# Patient Record
Sex: MALE | Race: WHITE | NOT HISPANIC OR LATINO | Employment: FULL TIME | URBAN - NONMETROPOLITAN AREA
[De-identification: names, ages, dates, MRNs, and addresses within clinical notes are randomized per-mention and may not be internally consistent; named-entity substitution may affect disease eponyms.]

---

## 2019-01-07 ENCOUNTER — HOSPITAL ENCOUNTER (EMERGENCY)
Facility: HOSPITAL | Age: 49
Discharge: HOME | End: 2019-01-07
Attending: FAMILY MEDICINE | Admitting: FAMILY MEDICINE

## 2019-01-07 ENCOUNTER — APPOINTMENT (OUTPATIENT)
Dept: CT IMAGING | Facility: HOSPITAL | Age: 49
End: 2019-01-07

## 2019-01-07 VITALS
WEIGHT: 265 LBS | HEART RATE: 60 BPM | HEIGHT: 75 IN | DIASTOLIC BLOOD PRESSURE: 71 MMHG | BODY MASS INDEX: 32.95 KG/M2 | SYSTOLIC BLOOD PRESSURE: 125 MMHG | TEMPERATURE: 97 F | OXYGEN SATURATION: 96 % | RESPIRATION RATE: 18 BRPM

## 2019-01-07 DIAGNOSIS — R10.9 ABDOMINAL PAIN, UNSPECIFIED ABDOMINAL LOCATION: Primary | ICD-10-CM

## 2019-01-07 LAB
ALBUMIN SERPL-MCNC: 4.5 G/DL (ref 3.4–4.8)
ALBUMIN/GLOB SERPL: 2.1 G/DL (ref 1.1–1.8)
ALP SERPL-CCNC: 56 U/L (ref 38–126)
ALT SERPL W P-5'-P-CCNC: 26 U/L (ref 21–72)
AMYLASE SERPL-CCNC: 67 U/L (ref 50–130)
ANION GAP SERPL CALCULATED.3IONS-SCNC: 10 MMOL/L (ref 5–15)
AST SERPL-CCNC: 35 U/L (ref 17–59)
BASOPHILS # BLD AUTO: 0.02 10*3/MM3 (ref 0–0.2)
BASOPHILS NFR BLD AUTO: 0.2 % (ref 0–2)
BILIRUB SERPL-MCNC: 0.7 MG/DL (ref 0.2–1.3)
BILIRUB UR QL STRIP: NEGATIVE
BUN BLD-MCNC: 10 MG/DL (ref 7–21)
BUN/CREAT SERPL: 10.6 (ref 7–25)
CALCIUM SPEC-SCNC: 9.3 MG/DL (ref 8.4–10.2)
CHLORIDE SERPL-SCNC: 97 MMOL/L (ref 95–110)
CK SERPL-CCNC: 96 U/L (ref 55–170)
CLARITY UR: CLEAR
CO2 SERPL-SCNC: 27 MMOL/L (ref 22–31)
COLOR UR: YELLOW
CREAT BLD-MCNC: 0.94 MG/DL (ref 0.7–1.3)
DEPRECATED RDW RBC AUTO: 42.3 FL (ref 35.1–43.9)
EOSINOPHIL # BLD AUTO: 0.12 10*3/MM3 (ref 0–0.7)
EOSINOPHIL NFR BLD AUTO: 1.2 % (ref 0–7)
ERYTHROCYTE [DISTWIDTH] IN BLOOD BY AUTOMATED COUNT: 12.7 % (ref 11.5–14.5)
GFR SERPL CREATININE-BSD FRML MDRD: 85 ML/MIN/1.73 (ref 56–130)
GLOBULIN UR ELPH-MCNC: 2.1 GM/DL (ref 2.3–3.5)
GLUCOSE BLD-MCNC: 92 MG/DL (ref 60–100)
GLUCOSE UR STRIP-MCNC: NEGATIVE MG/DL
HCT VFR BLD AUTO: 46.9 % (ref 39–49)
HGB BLD-MCNC: 16.1 G/DL (ref 13.7–17.3)
HGB UR QL STRIP.AUTO: NEGATIVE
HOLD SPECIMEN: NORMAL
IMM GRANULOCYTES # BLD AUTO: 0.02 10*3/MM3 (ref 0–0.02)
IMM GRANULOCYTES NFR BLD AUTO: 0.2 % (ref 0–0.5)
KETONES UR QL STRIP: ABNORMAL
LEUKOCYTE ESTERASE UR QL STRIP.AUTO: NEGATIVE
LIPASE SERPL-CCNC: 72 U/L (ref 23–300)
LYMPHOCYTES # BLD AUTO: 2.46 10*3/MM3 (ref 0.6–4.2)
LYMPHOCYTES NFR BLD AUTO: 24.1 % (ref 10–50)
MCH RBC QN AUTO: 31.3 PG (ref 26.5–34)
MCHC RBC AUTO-ENTMCNC: 34.3 G/DL (ref 31.5–36.3)
MCV RBC AUTO: 91.1 FL (ref 80–98)
MONOCYTES # BLD AUTO: 0.44 10*3/MM3 (ref 0–0.9)
MONOCYTES NFR BLD AUTO: 4.3 % (ref 0–12)
NEUTROPHILS # BLD AUTO: 7.15 10*3/MM3 (ref 2–8.6)
NEUTROPHILS NFR BLD AUTO: 70 % (ref 37–80)
NITRITE UR QL STRIP: NEGATIVE
PH UR STRIP.AUTO: 5.5 [PH] (ref 5–9)
PLATELET # BLD AUTO: 199 10*3/MM3 (ref 150–450)
PMV BLD AUTO: 11.3 FL (ref 8–12)
POTASSIUM BLD-SCNC: 3.8 MMOL/L (ref 3.5–5.1)
PROT SERPL-MCNC: 6.6 G/DL (ref 6.3–8.6)
PROT UR QL STRIP: NEGATIVE
RBC # BLD AUTO: 5.15 10*6/MM3 (ref 4.37–5.74)
SODIUM BLD-SCNC: 134 MMOL/L (ref 137–145)
SP GR UR STRIP: 1.03 (ref 1–1.03)
UROBILINOGEN UR QL STRIP: ABNORMAL
WBC NRBC COR # BLD: 10.21 10*3/MM3 (ref 3.2–9.8)
WHOLE BLOOD HOLD SPECIMEN: NORMAL

## 2019-01-07 PROCEDURE — 25010000002 ONDANSETRON PER 1 MG: Performed by: FAMILY MEDICINE

## 2019-01-07 PROCEDURE — 85025 COMPLETE CBC W/AUTO DIFF WBC: CPT | Performed by: FAMILY MEDICINE

## 2019-01-07 PROCEDURE — 99284 EMERGENCY DEPT VISIT MOD MDM: CPT

## 2019-01-07 PROCEDURE — 0 DIATRIZOATE MEGLUMINE & SODIUM PER 1 ML: Performed by: FAMILY MEDICINE

## 2019-01-07 PROCEDURE — 82550 ASSAY OF CK (CPK): CPT | Performed by: FAMILY MEDICINE

## 2019-01-07 PROCEDURE — 96361 HYDRATE IV INFUSION ADD-ON: CPT

## 2019-01-07 PROCEDURE — 96374 THER/PROPH/DIAG INJ IV PUSH: CPT

## 2019-01-07 PROCEDURE — 96375 TX/PRO/DX INJ NEW DRUG ADDON: CPT

## 2019-01-07 PROCEDURE — 82150 ASSAY OF AMYLASE: CPT | Performed by: FAMILY MEDICINE

## 2019-01-07 PROCEDURE — 25010000002 MORPHINE PER 10 MG: Performed by: FAMILY MEDICINE

## 2019-01-07 PROCEDURE — 74177 CT ABD & PELVIS W/CONTRAST: CPT

## 2019-01-07 PROCEDURE — 83690 ASSAY OF LIPASE: CPT | Performed by: FAMILY MEDICINE

## 2019-01-07 PROCEDURE — 80053 COMPREHEN METABOLIC PANEL: CPT | Performed by: FAMILY MEDICINE

## 2019-01-07 PROCEDURE — 81003 URINALYSIS AUTO W/O SCOPE: CPT | Performed by: FAMILY MEDICINE

## 2019-01-07 PROCEDURE — 25010000002 IOPAMIDOL 61 % SOLUTION: Performed by: FAMILY MEDICINE

## 2019-01-07 RX ORDER — HYDROCODONE BITARTRATE AND ACETAMINOPHEN 10; 325 MG/1; MG/1
1 TABLET ORAL EVERY 6 HOURS PRN
COMMUNITY

## 2019-01-07 RX ORDER — LORAZEPAM 1 MG/1
1 TABLET ORAL EVERY 8 HOURS PRN
COMMUNITY

## 2019-01-07 RX ORDER — ONDANSETRON 2 MG/ML
4 INJECTION INTRAMUSCULAR; INTRAVENOUS ONCE
Status: COMPLETED | OUTPATIENT
Start: 2019-01-07 | End: 2019-01-07

## 2019-01-07 RX ADMIN — IOPAMIDOL 94 ML: 612 INJECTION, SOLUTION INTRAVENOUS at 19:40

## 2019-01-07 RX ADMIN — ONDANSETRON 4 MG: 2 INJECTION INTRAMUSCULAR; INTRAVENOUS at 17:53

## 2019-01-07 RX ADMIN — MORPHINE SULFATE 4 MG: 4 INJECTION INTRAVENOUS at 17:53

## 2019-01-07 RX ADMIN — SODIUM CHLORIDE 1000 ML: 9 INJECTION, SOLUTION INTRAVENOUS at 17:51

## 2019-01-07 RX ADMIN — DIATRIZOATE MEGLUMINE AND DIATRIZOATE SODIUM 30 ML: 660; 100 LIQUID ORAL; RECTAL at 19:41

## 2019-01-07 NOTE — ED NOTES
Lobby announcement made at this time to explain wait times.     Cathie Krishnan RN  01/07/19 3290

## 2019-01-08 NOTE — ED PROVIDER NOTES
Subjective   Patient developed sharp abdominal pain at roughly 7 AM this this morning.  Nausea began in the past hour.  Patient does have a history of gastric bypass surgery and back surgery.        Abdominal Pain   Pain location:  Periumbilical and LLQ  Pain quality: cramping and sharp    Pain radiates to:  Does not radiate  Pain severity:  Moderate  Onset quality:  Sudden  Duration:  10 hours  Timing:  Intermittent  Progression:  Waxing and waning  Chronicity:  New  Context: not alcohol use, not suspicious food intake and not trauma    Relieved by:  Nothing  Worsened by:  Nothing  Ineffective treatments:  None tried  Associated symptoms: nausea    Associated symptoms: no chest pain, no chills, no cough, no diarrhea, no dysuria, no fatigue, no fever, no shortness of breath, no sore throat and no vomiting    Risk factors: multiple surgeries    Nausea   The primary symptoms include abdominal pain and nausea. Primary symptoms do not include fever, fatigue, vomiting, diarrhea, dysuria, myalgias or rash.   The illness does not include chills.       Review of Systems   Constitutional: Negative for appetite change, chills, diaphoresis, fatigue and fever.   HENT: Negative for congestion, ear discharge, ear pain, nosebleeds, rhinorrhea, sinus pressure, sore throat and trouble swallowing.    Eyes: Negative for discharge and redness.   Respiratory: Negative for apnea, cough, chest tightness, shortness of breath and wheezing.    Cardiovascular: Negative for chest pain.   Gastrointestinal: Positive for abdominal pain and nausea. Negative for diarrhea and vomiting.   Endocrine: Negative for polyuria.   Genitourinary: Negative for dysuria, frequency and urgency.   Musculoskeletal: Negative for myalgias and neck pain.   Skin: Negative for color change and rash.   Allergic/Immunologic: Negative for immunocompromised state.   Neurological: Negative for dizziness, seizures, syncope, weakness, light-headedness and headaches.    Hematological: Negative for adenopathy. Does not bruise/bleed easily.   Psychiatric/Behavioral: Negative for behavioral problems and confusion.   All other systems reviewed and are negative.      Past Medical History:   Diagnosis Date   • Arthritis    • Injury of back    • Scoliosis        No Known Allergies    Past Surgical History:   Procedure Laterality Date   • BACK SURGERY     • JENNY-EN-Y PROCEDURE         History reviewed. No pertinent family history.    Social History     Socioeconomic History   • Marital status:      Spouse name: Not on file   • Number of children: Not on file   • Years of education: Not on file   • Highest education level: Not on file   Tobacco Use   • Smoking status: Current Some Day Smoker   Substance and Sexual Activity   • Alcohol use: No     Frequency: Never   • Drug use: No   • Sexual activity: Defer           Objective   Physical Exam   Constitutional: He is oriented to person, place, and time. He appears well-developed and well-nourished.   HENT:   Head: Normocephalic and atraumatic.   Nose: Nose normal.   Mouth/Throat: Oropharynx is clear and moist.   Eyes: Conjunctivae and EOM are normal. Pupils are equal, round, and reactive to light. Right eye exhibits no discharge. Left eye exhibits no discharge. No scleral icterus.   Neck: Normal range of motion. Neck supple. No tracheal deviation present.   Cardiovascular: Normal rate, regular rhythm and normal heart sounds.   No murmur heard.  Pulmonary/Chest: Effort normal and breath sounds normal. No stridor. No respiratory distress. He has no wheezes. He has no rales.   Abdominal: Soft. Bowel sounds are normal. He exhibits no distension and no mass. There is tenderness in the periumbilical area and left lower quadrant. There is no rebound and no guarding.   Musculoskeletal: He exhibits no edema.   Neurological: He is alert and oriented to person, place, and time. Coordination normal.   Skin: Skin is warm and dry. No rash noted.  No erythema.   Psychiatric: He has a normal mood and affect. His behavior is normal. Thought content normal.   Nursing note and vitals reviewed.      Procedures           ED Course  ED Course as of Jan 07 2014 Mon Jan 07, 2019   1909 Patient signed out to me at shift change at 1900 by Dr. Miller.  Patient is alert and resting comfortably.  Awaiting CT scan.  [DR]   2012 Patient examined abdomen is soft and nondistended and nontender to palpation in all 4 quadrants.  I reviewed the results of the patient's evaluation with him and his wife.  I recommended primary care follow-up tomorrow.  I advised him to return to the emergency department if he develops a fever or if his symptoms change or worsen or any concerns.  [DR]      ED Course User Index  [DR] Deo Albrecht MD      Labs Reviewed   COMPREHENSIVE METABOLIC PANEL - Abnormal; Notable for the following components:       Result Value    Sodium 134 (*)     Globulin 2.1 (*)     A/G Ratio 2.1 (*)     All other components within normal limits   URINALYSIS W/ MICROSCOPIC IF INDICATED (NO CULTURE) - Abnormal; Notable for the following components:    Ketones, UA Trace (*)     All other components within normal limits    Narrative:     Urine microscopic not indicated.   CBC WITH AUTO DIFFERENTIAL - Abnormal; Notable for the following components:    WBC 10.21 (*)     All other components within normal limits   LIPASE - Normal   AMYLASE - Normal   CK - Normal   CBC AND DIFFERENTIAL    Narrative:     The following orders were created for panel order CBC & Differential.  Procedure                               Abnormality         Status                     ---------                               -----------         ------                     CBC Auto Differential[[id number]]        Abnormal            Final result                 Please view results for these tests on the individual orders.   EXTRA TUBES    Narrative:     The following orders were created for panel order Extra  Tubes.  Procedure                               Abnormality         Status                     ---------                               -----------         ------                     Light Blue Top[[id number]]                                   Final result               Gold Top - SST[[id number]]                                   Final result                 Please view results for these tests on the individual orders.   LIGHT BLUE TOP   GOLD TOP - SST     Ct Abdomen Pelvis With Contrast    Result Date: 1/7/2019  Narrative: PROCEDURE: CT ABDOMEN PELVIS W CONTRAST HISTORY: LLQ pain, suspect diverticulitis Indication: Patient has history of back surgery and gastric bypass surgery Comparison: None . Technique: CT of the abdomen and pelvis was done with intravenous contrast. Images were obtained from the lung base to the level of the pubic symphysis in axial plane, followed by orthogonal sagittal and coronal reconstruction. Oral contrast was also given for the study. The patient was injected with 94 mL of Isovue-300 intravenously, without any documented immediate adverse reactions. This exam was performed according to our departmental dose-optimization program, which includes automated exposure control, adjustment of the mA and/or KV according to the patient's size and/or use of iterative reconstruction technique. FINDINGS:  Images through the lung bases do not show any focal infiltrates or pleural effusions. The liver, gallbladder, pancreas, spleen and the bilateral adrenal glands appear unremarkable. The bilateral kidneys enhance with contrast in a normal fashion. The urinary bladder is unremarkable. The prostate is top normal in size . The bilateral ureters and the bilateral periureteral soft tissues and fat planes are unremarkable. The small bowel appears unremarkable, without any evidence of small bowel obstruction or bowel wall thickening. There is no CT evidence of pericecal inflammatory change or ileocecal  mesenteric adenitis. The appendix is not uniquely identified The ileocecal junction appears unremarkable. There is no CT evidence of acute colonic diverticulitis or colitis or large bowel obstruction. The splenic and portal veins are of normal caliber, without any filling defects. There is no pathological lymphadenopathy in the retroperitoneum or in the pelvic region. There is no evidence of free fluid or free air in the abdomen or the pelvic region. There is no clinically significant abdominal aortic aneurysm. There is no clinically significant inguinal or ventral hernia. Scoliotic curvature of the visualized lower thoracic and the lumbar spine is noted. There is multilevel surgery in the visualized lower thoracic spine and the lumbar spine. The surgical hardware terminates at L4 level The paravertebral soft tissues are unremarkable. The  remainder of the pelvic structures are unremarkable.     Impression:  There are no acute findings in the abdomen and the pelvis. Location of Interpretation: Teleradiology Electronically signed by:  Alli Mack MD  1/7/2019 7:57 PM CST Workstation: [id number]              [facility]      Final diagnoses:   Abdominal pain, unspecified abdominal location            Deo Albrecht MD  01/07/19 2014

## 2022-02-01 ENCOUNTER — APPOINTMENT (OUTPATIENT)
Dept: GENERAL RADIOLOGY | Facility: HOSPITAL | Age: 52
End: 2022-02-01

## 2022-02-01 ENCOUNTER — APPOINTMENT (OUTPATIENT)
Dept: CT IMAGING | Facility: HOSPITAL | Age: 52
End: 2022-02-01

## 2022-02-01 ENCOUNTER — HOSPITAL ENCOUNTER (EMERGENCY)
Facility: HOSPITAL | Age: 52
Discharge: HOME | End: 2022-02-01
Attending: EMERGENCY MEDICINE | Admitting: EMERGENCY MEDICINE

## 2022-02-01 ENCOUNTER — APPOINTMENT (OUTPATIENT)
Dept: MRI IMAGING | Facility: HOSPITAL | Age: 52
End: 2022-02-01

## 2022-02-01 VITALS
DIASTOLIC BLOOD PRESSURE: 77 MMHG | WEIGHT: 260 LBS | TEMPERATURE: 98 F | HEART RATE: 76 BPM | OXYGEN SATURATION: 96 % | SYSTOLIC BLOOD PRESSURE: 150 MMHG | HEIGHT: 75 IN | BODY MASS INDEX: 32.33 KG/M2 | RESPIRATION RATE: 20 BRPM

## 2022-02-01 DIAGNOSIS — R42 DIZZINESS: Primary | ICD-10-CM

## 2022-02-01 LAB
ABO GROUP BLD: NORMAL
ABO GROUP BLD: NORMAL
ALBUMIN SERPL-MCNC: 4.8 G/DL (ref 3.5–5.2)
ALBUMIN/GLOB SERPL: 2.4 G/DL
ALP SERPL-CCNC: 76 U/L (ref 39–117)
ALT SERPL W P-5'-P-CCNC: 27 U/L (ref 1–41)
ANION GAP SERPL CALCULATED.3IONS-SCNC: 7 MMOL/L (ref 5–15)
APTT PPP: 28.7 SECONDS (ref 20–40.3)
AST SERPL-CCNC: 24 U/L (ref 1–40)
BASOPHILS # BLD AUTO: 0.04 10*3/MM3 (ref 0–0.2)
BASOPHILS NFR BLD AUTO: 0.4 % (ref 0–1.5)
BILIRUB SERPL-MCNC: 0.5 MG/DL (ref 0–1.2)
BLD GP AB SCN SERPL QL: NEGATIVE
BUN SERPL-MCNC: 14 MG/DL (ref 6–20)
BUN/CREAT SERPL: 13.1 (ref 7–25)
CALCIUM SPEC-SCNC: 9.4 MG/DL (ref 8.6–10.5)
CHLORIDE SERPL-SCNC: 104 MMOL/L (ref 98–107)
CO2 SERPL-SCNC: 29 MMOL/L (ref 22–29)
CREAT SERPL-MCNC: 1.07 MG/DL (ref 0.76–1.27)
DEPRECATED RDW RBC AUTO: 46.4 FL (ref 37–54)
EOSINOPHIL # BLD AUTO: 0.08 10*3/MM3 (ref 0–0.4)
EOSINOPHIL NFR BLD AUTO: 0.8 % (ref 0.3–6.2)
ERYTHROCYTE [DISTWIDTH] IN BLOOD BY AUTOMATED COUNT: 13.4 % (ref 12.3–15.4)
GFR SERPL CREATININE-BSD FRML MDRD: 72 ML/MIN/1.73
GLOBULIN UR ELPH-MCNC: 2 GM/DL
GLUCOSE BLDC GLUCOMTR-MCNC: 108 MG/DL (ref 70–130)
GLUCOSE SERPL-MCNC: 96 MG/DL (ref 65–99)
HCT VFR BLD AUTO: 50.3 % (ref 37.5–51)
HGB BLD-MCNC: 16.9 G/DL (ref 13–17.7)
HOLD SPECIMEN: NORMAL
IMM GRANULOCYTES # BLD AUTO: 0.08 10*3/MM3 (ref 0–0.05)
IMM GRANULOCYTES NFR BLD AUTO: 0.8 % (ref 0–0.5)
INR PPP: 0.92 (ref 0.8–1.2)
LYMPHOCYTES # BLD AUTO: 2.25 10*3/MM3 (ref 0.7–3.1)
LYMPHOCYTES NFR BLD AUTO: 23.2 % (ref 19.6–45.3)
Lab: NORMAL
MCH RBC QN AUTO: 31.5 PG (ref 26.6–33)
MCHC RBC AUTO-ENTMCNC: 33.6 G/DL (ref 31.5–35.7)
MCV RBC AUTO: 93.8 FL (ref 79–97)
MONOCYTES # BLD AUTO: 0.6 10*3/MM3 (ref 0.1–0.9)
MONOCYTES NFR BLD AUTO: 6.2 % (ref 5–12)
NEUTROPHILS NFR BLD AUTO: 6.64 10*3/MM3 (ref 1.7–7)
NEUTROPHILS NFR BLD AUTO: 68.6 % (ref 42.7–76)
NRBC BLD AUTO-RTO: 0 /100 WBC (ref 0–0.2)
PLATELET # BLD AUTO: 253 10*3/MM3 (ref 140–450)
PMV BLD AUTO: 10.1 FL (ref 6–12)
POTASSIUM SERPL-SCNC: 4 MMOL/L (ref 3.5–5.2)
PROT SERPL-MCNC: 6.8 G/DL (ref 6–8.5)
PROTHROMBIN TIME: 12.3 SECONDS (ref 11.1–15.3)
RBC # BLD AUTO: 5.36 10*6/MM3 (ref 4.14–5.8)
RH BLD: POSITIVE
RH BLD: POSITIVE
SODIUM SERPL-SCNC: 140 MMOL/L (ref 136–145)
T&S EXPIRATION DATE: NORMAL
TROPONIN T SERPL-MCNC: <0.01 NG/ML (ref 0–0.03)
WBC NRBC COR # BLD: 9.69 10*3/MM3 (ref 3.4–10.8)
WHOLE BLOOD HOLD SPECIMEN: NORMAL
WHOLE BLOOD HOLD SPECIMEN: NORMAL

## 2022-02-01 PROCEDURE — 85025 COMPLETE CBC W/AUTO DIFF WBC: CPT | Performed by: EMERGENCY MEDICINE

## 2022-02-01 PROCEDURE — 70551 MRI BRAIN STEM W/O DYE: CPT

## 2022-02-01 PROCEDURE — 86901 BLOOD TYPING SEROLOGIC RH(D): CPT

## 2022-02-01 PROCEDURE — 84484 ASSAY OF TROPONIN QUANT: CPT | Performed by: EMERGENCY MEDICINE

## 2022-02-01 PROCEDURE — 99284 EMERGENCY DEPT VISIT MOD MDM: CPT

## 2022-02-01 PROCEDURE — 86901 BLOOD TYPING SEROLOGIC RH(D): CPT | Performed by: EMERGENCY MEDICINE

## 2022-02-01 PROCEDURE — 85730 THROMBOPLASTIN TIME PARTIAL: CPT | Performed by: EMERGENCY MEDICINE

## 2022-02-01 PROCEDURE — 70498 CT ANGIOGRAPHY NECK: CPT

## 2022-02-01 PROCEDURE — 70496 CT ANGIOGRAPHY HEAD: CPT

## 2022-02-01 PROCEDURE — 86900 BLOOD TYPING SEROLOGIC ABO: CPT | Performed by: EMERGENCY MEDICINE

## 2022-02-01 PROCEDURE — 70450 CT HEAD/BRAIN W/O DYE: CPT

## 2022-02-01 PROCEDURE — 93005 ELECTROCARDIOGRAM TRACING: CPT | Performed by: EMERGENCY MEDICINE

## 2022-02-01 PROCEDURE — 86900 BLOOD TYPING SEROLOGIC ABO: CPT

## 2022-02-01 PROCEDURE — 0 IOPAMIDOL PER 1 ML: Performed by: EMERGENCY MEDICINE

## 2022-02-01 PROCEDURE — 93010 ELECTROCARDIOGRAM REPORT: CPT | Performed by: INTERNAL MEDICINE

## 2022-02-01 PROCEDURE — 80053 COMPREHEN METABOLIC PANEL: CPT | Performed by: EMERGENCY MEDICINE

## 2022-02-01 PROCEDURE — 99282 EMERGENCY DEPT VISIT SF MDM: CPT | Performed by: NURSE PRACTITIONER

## 2022-02-01 PROCEDURE — 86850 RBC ANTIBODY SCREEN: CPT | Performed by: EMERGENCY MEDICINE

## 2022-02-01 PROCEDURE — 71045 X-RAY EXAM CHEST 1 VIEW: CPT

## 2022-02-01 PROCEDURE — 36415 COLL VENOUS BLD VENIPUNCTURE: CPT | Performed by: EMERGENCY MEDICINE

## 2022-02-01 PROCEDURE — 85610 PROTHROMBIN TIME: CPT | Performed by: EMERGENCY MEDICINE

## 2022-02-01 PROCEDURE — 82962 GLUCOSE BLOOD TEST: CPT

## 2022-02-01 RX ORDER — SODIUM CHLORIDE 0.9 % (FLUSH) 0.9 %
10 SYRINGE (ML) INJECTION AS NEEDED
Status: DISCONTINUED | OUTPATIENT
Start: 2022-02-01 | End: 2022-02-01 | Stop reason: HOSPADM

## 2022-02-01 RX ORDER — PROPRANOLOL HYDROCHLORIDE 10 MG/1
1 TABLET ORAL 3 TIMES DAILY PRN
COMMUNITY
Start: 2021-12-21

## 2022-02-01 RX ORDER — DULOXETIN HYDROCHLORIDE 60 MG/1
60 CAPSULE, DELAYED RELEASE ORAL
COMMUNITY
Start: 2021-09-02

## 2022-02-01 RX ORDER — MECLIZINE HYDROCHLORIDE 25 MG/1
25 TABLET ORAL EVERY 6 HOURS PRN
Qty: 12 TABLET | Refills: 0 | Status: SHIPPED | OUTPATIENT
Start: 2022-02-01

## 2022-02-01 RX ORDER — AMLODIPINE BESYLATE 10 MG/1
TABLET ORAL
COMMUNITY
Start: 2021-08-24

## 2022-02-01 RX ORDER — LISINOPRIL 40 MG/1
40 TABLET ORAL DAILY
COMMUNITY
Start: 2021-08-24 | End: 2022-08-25

## 2022-02-01 RX ORDER — ASPIRIN 325 MG
325 TABLET ORAL DAILY
Status: DISCONTINUED | OUTPATIENT
Start: 2022-02-01 | End: 2022-02-01 | Stop reason: HOSPADM

## 2022-02-01 RX ADMIN — ASPIRIN 325 MG: 325 TABLET ORAL at 13:48

## 2022-02-01 RX ADMIN — IOPAMIDOL 93 ML: 755 INJECTION, SOLUTION INTRAVENOUS at 13:01

## 2022-02-01 NOTE — ED PROVIDER NOTES
Subjective   52 years old male with history of hypertension, tobacco abuse presented in the ER with sudden onset feeling dizzy lightheaded and room spinning sensation around 10 AM with blurry vision and feeling as if he was going to pass out. He was also having difficulty with word forming, denies any focal numbness or weakness. It lasted for almost an hour and has blurry vision and speech issue improved first followed by dizziness. Currently he is feeling as if he is having a brain fog and not feeling back to his baseline. Denies any chest pain palpitations or shortness of breath. Denies any history of stroke.      History provided by:  Patient      Review of Systems   Constitutional: Positive for fatigue. Negative for chills and fever.   HENT: Negative for congestion, postnasal drip, sinus pressure, sneezing and sore throat.    Eyes: Positive for visual disturbance.   Respiratory: Negative for chest tightness and shortness of breath.    Cardiovascular: Negative for chest pain and palpitations.   Gastrointestinal: Negative for abdominal pain, nausea and vomiting.   Skin: Negative for color change.   Neurological: Positive for dizziness and speech difficulty.   Psychiatric/Behavioral: Negative for agitation.       Past Medical History:   Diagnosis Date   • Arthritis    • Injury of back    • Scoliosis        No Known Allergies    Past Surgical History:   Procedure Laterality Date   • BACK SURGERY     • GASTRIC BYPASS     • JENNY-EN-Y PROCEDURE         History reviewed. No pertinent family history.    Social History     Socioeconomic History   • Marital status:    Tobacco Use   • Smoking status: Current Some Day Smoker   Substance and Sexual Activity   • Alcohol use: No   • Drug use: No   • Sexual activity: Defer           Objective   Physical Exam  Vitals and nursing note reviewed.   Constitutional:       Appearance: Normal appearance.   HENT:      Head: Normocephalic.      Right Ear: Tympanic membrane, ear  canal and external ear normal.      Left Ear: Tympanic membrane, ear canal and external ear normal.      Nose: Nose normal.      Mouth/Throat:      Mouth: Mucous membranes are moist.   Eyes:      Extraocular Movements: Extraocular movements intact.      Conjunctiva/sclera: Conjunctivae normal.      Pupils: Pupils are equal, round, and reactive to light.   Cardiovascular:      Rate and Rhythm: Normal rate and regular rhythm.   Pulmonary:      Effort: Pulmonary effort is normal.      Breath sounds: Normal breath sounds.   Abdominal:      General: Abdomen is flat. Bowel sounds are normal.      Palpations: Abdomen is soft.   Musculoskeletal:         General: Normal range of motion.      Cervical back: Normal range of motion and neck supple.   Skin:     General: Skin is warm.      Capillary Refill: Capillary refill takes less than 2 seconds.   Neurological:      General: No focal deficit present.      Mental Status: He is alert and oriented to person, place, and time.      Cranial Nerves: No cranial nerve deficit.      Sensory: No sensory deficit.      Motor: No weakness.      Coordination: Coordination normal.      Deep Tendon Reflexes: Reflexes normal.   Psychiatric:         Mood and Affect: Mood normal.         ECG 12 Lead      Date/Time: 2/1/2022 12:41 PM  Performed by: Kostas Zavala MD  Authorized by: Kostas Zavala MD   Interpreted by physician  Rhythm: sinus rhythm  Rate: normal  BPM: 75  QRS axis: normal  T depression: II, III and aVF  Clinical impression: abnormal ECG                 ED Course                                                 MDM  Number of Diagnoses or Management Options  Dizziness  Diagnosis management comments: Patient is made stroke activate, CT head is obtained which is negative for any acute findings.  Discussed with Dr. Mohsin, recommended CTA head neck and MRI.  If CTAs and MRI are negative, patient can be discharged with outpatient EEG.  Is negative CTAs and MRI, patient feeling better and  back to his baseline.  Neurology recommended outpatient follow-up and EEG.  Plan discussed with patient who understand and agrees with it.       Amount and/or Complexity of Data Reviewed  Clinical lab tests: ordered and reviewed  Tests in the radiology section of CPT®: ordered and reviewed  Discuss the patient with other providers: yes      Labs Reviewed   CBC WITH AUTO DIFFERENTIAL - Abnormal; Notable for the following components:       Result Value    Immature Grans % 0.8 (*)     Immature Grans, Absolute 0.08 (*)     All other components within normal limits   PROTIME-INR - Normal    Narrative:     Therapeutic range for most indications is 2.0-3.0 INR,  or 2.5-3.5 for mechanical heart valves.   APTT - Normal    Narrative:     The recommended Heparin therapeutic range is 68-97 seconds.   TROPONIN (IN-HOUSE) - Normal    Narrative:     Troponin T Reference Range:  <= 0.03 ng/mL-   Negative for AMI  >0.03 ng/mL-     Abnormal for myocardial necrosis.  Clinicians would have to utilize clinical acumen, EKG, Troponin and serial changes to determine if it is an Acute Myocardial Infarction or myocardial injury due to an underlying chronic condition.       Results may be falsely decreased if patient taking Biotin.     POCT GLUCOSE FINGERSTICK - Normal   RAINBOW DRAW    Narrative:     The following orders were created for panel order [place] Draw.  Procedure                               Abnormality         Status                     ---------                               -----------         ------                     Green Top (Gel)[[id number]]                                  Final result               Lavender Top[[id number]]                                     Final result               Gold Top - SST[[id number]]                                   Final result               Light Blue Top[[id number]]                                   Final result                 Please view results for these tests on the individual orders.    COMPREHENSIVE METABOLIC PANEL    Narrative:     GFR Normal >60  Chronic Kidney Disease <60  Kidney Failure <15     POCT GLUCOSE FINGERSTICK   TYPE AND SCREEN   PREVIOUS HISTORY   ABORH 2ND SPECIMEN VERIFICATION   CBC AND DIFFERENTIAL    Narrative:     The following orders were created for panel order CBC & Differential.  Procedure                               Abnormality         Status                     ---------                               -----------         ------                     CBC Auto Differential[[id number]]        Abnormal            Final result                 Please view results for these tests on the individual orders.   GREEN TOP   LAVENDER TOP   GOLD TOP - SST   LIGHT BLUE TOP   EXTRA TUBES    Narrative:     The following orders were created for panel order Extra Tubes.  Procedure                               Abnormality         Status                     ---------                               -----------         ------                     Sommer Top[[id number]]                                         In process                   Please view results for these tests on the individual orders.   GRAY TOP       CT Angiogram Neck    Result Date: 2/1/2022  Narrative: PROCEDURE: CT HEAD ANGIOGRAPHY WITHOUT THEN WITH IV CONTRAST, CT NECK ANGIOGRAPHY WITHOUT THEN WITH IV CONTRAST CLINICAL HISTORY: Acute Stroke. COMPARISON: None TECHNIQUE: CT angiography of the head and neck was performed with intravenous contrast on the level of the aortic arch to the vertex of the brain in orthogonal planes, along with 3D reconstruction of the arterial vasculature of the head and neck from the source images. Viz-AI: Study has been analyzed with deep learning artificial intelligence algorithm for large vessel occlusion detection. Measurement of carotid stenosis is based on NASCET criteria which calculates the percentage of stenosis relative to the luminal diameter of the normal carotid artery distal to the stenosis.  CONTRAST: 93 mL IV Isovue 370 This exam was performed using radiation doses that are as low as reasonably achievable (ALARA). This exam was performed according to our departmental dose optimization program, which includes automated exposure control, adjustment of the mA and/or KV according to patient size and/or use of iterative reconstruction technique. CTA HEAD AND NECK FINDINGS: There is no hemodynamically significant stenosis or dissection in the bilateral common carotid, bilateral proximal internal carotid or the visualized portions of the bilateral external carotid arteries. The carotid bulbs are without significant stenosis. There is normal takeoff of the great vessels from the aortic arch. The bilateral vertebral arteries appear patent with no evidence of dissection on either side.  The basilar tip appears normal. There is no hemodynamically significant stenosis or a focal aneurysm formation, in the intracranial portions of the bilateral internal carotid arteries, bilateral carotid siphons, bilateral anterior, middle and posterior cerebral arteries and their visualized branches. Limited evaluation of the dural sinuses and the intracranial and venous system shows a widely patent superior sagittal sinus, straight sinus and bilateral internal cerebral veins. The lung apices normal. The cervical spine normal.     Impression: Negative CTA head and neck. Electronically signed by:  Christian Manuel MD  2/1/2022 2:09 PM CST Workstation: [id number]    MRI Brain Without Contrast    Result Date: 2/1/2022  Narrative: Procedure: MRI brain without contrast Reason for exam: Neuro deficit acute FINDINGS: Multisequence multiplanar MR imaging of the brain was performed without contrast. The diffusion weighted series is normal. There is no abnormal signal seen to suggest restricted diffusion. Cerebral and cerebellar parenchyma normal. Ventricular system and subarachnoid spaces are normal.     Impression: No acute MRI brain  abnormality. Electronically signed by:  Christian Manuel MD  2/1/2022 2:00 PM CST Workstation: [id number]    XR Chest 1 View    Result Date: 2/1/2022  Narrative: PROCEDURE: XR CHEST 1 VIEW Clinical History: Acute Stroke Protocol (Onset < 12 hrs) Indication:  Same as above. Comparison:  8/4/2015. Technique: Single portable frontal projection of the chest was done. Findings: A stable benign calcified granuloma is again seen in the left lung base. Prior surgery is again seen in the thoracic and the visualized upper lumbar spine. There is presence of minimal bibasilar infiltrate/atelectasis. There are no pneumothoraces or pleural effusions. The pulmonary vascularity is normal. The cardiomediastinal contour is  unremarkable.     Impression: Impression: There is presence of minimal bibasilar infiltrate/atelectasis. Electronically signed by:  Alli Mack MD  2/1/2022 12:46 PM CST Workstation: [id number]    CT Head Without Contrast Stroke Protocol    Result Date: 2/1/2022  Narrative: Noncontrast CT examination of the brain. INDICATION: Stroke protocol.   Technique: Axial 5 mm contiguous images with brain parenchymal and bone windows This exam was performed according to our departmental dose-optimization program, which includes automated exposure control, adjustment of the mA and/or kV according to patient size and/or use of iterative reconstruction technique. Prior relevant examination: None. Brain parenchyma appears within normal limits. There are bilateral basal ganglia calcifications (incidental significance). Ventricles are within normal limits in size. No evidence of abnormal mass or calcification is seen. No evidence of acute hemorrhage is noted. Bony structures appear within normal limits and the mastoid air cells and visualized paranasal sinuses are normally aerated.     Impression: 1. Normal brain for age. There are no acute changes. Electronically signed by:  Rene Shea MD  2/1/2022 12:14 PM CST Workstation:  [id number]    CT Angiogram Head w AI Analysis of LVO    Result Date: 2/1/2022  Narrative: PROCEDURE: CT HEAD ANGIOGRAPHY WITHOUT THEN WITH IV CONTRAST, CT NECK ANGIOGRAPHY WITHOUT THEN WITH IV CONTRAST CLINICAL HISTORY: Acute Stroke. COMPARISON: None TECHNIQUE: CT angiography of the head and neck was performed with intravenous contrast on the level of the aortic arch to the vertex of the brain in orthogonal planes, along with 3D reconstruction of the arterial vasculature of the head and neck from the source images. Viz-AI: Study has been analyzed with deep learning artificial intelligence algorithm for large vessel occlusion detection. Measurement of carotid stenosis is based on NASCET criteria which calculates the percentage of stenosis relative to the luminal diameter of the normal carotid artery distal to the stenosis. CONTRAST: 93 mL IV Isovue 370 This exam was performed using radiation doses that are as low as reasonably achievable (ALARA). This exam was performed according to our departmental dose optimization program, which includes automated exposure control, adjustment of the mA and/or KV according to patient size and/or use of iterative reconstruction technique. CTA HEAD AND NECK FINDINGS: There is no hemodynamically significant stenosis or dissection in the bilateral common carotid, bilateral proximal internal carotid or the visualized portions of the bilateral external carotid arteries. The carotid bulbs are without significant stenosis. There is normal takeoff of the great vessels from the aortic arch. The bilateral vertebral arteries appear patent with no evidence of dissection on either side.  The basilar tip appears normal. There is no hemodynamically significant stenosis or a focal aneurysm formation, in the intracranial portions of the bilateral internal carotid arteries, bilateral carotid siphons, bilateral anterior, middle and posterior cerebral arteries and their visualized branches. Limited  evaluation of the dural sinuses and the intracranial and venous system shows a widely patent superior sagittal sinus, straight sinus and bilateral internal cerebral veins. The lung apices normal. The cervical spine normal.     Impression: Negative CTA head and neck. Electronically signed by:  Christian Manuel MD  2/1/2022 2:09 PM [facility] Workstation: [id number]          Final diagnoses:   Dizziness       ED Disposition  ED Disposition     ED Disposition Condition Comment    Discharge Stable           Sury Perez, APRN  [address] [facility] [place] [address] [phone]    Call in 1 day  for re evaluation, even if feeling better    Lawrence Maldonado MD  [address] [facility] [id number] [phone]    Call in 1 day  for re evaluation, even if feeling better    [facility] EMERGENCY DEPARTMENT  [address]  [facility] [address] [phone]    As needed, If symptoms worsen         Medication List      New Prescriptions    meclizine 25 MG tablet  Commonly known as: ANTIVERT  Take 1 tablet by mouth Every 6 (Six) Hours As Needed for Dizziness.           Where to Get Your Medications      These medications were sent to [facility] - [place], [place] - [address] [place] [address] - [phone]  - [phone] [address] [address]    Phone: [phone]   · meclizine 25 MG tablet          Kostas Zavala MD  02/01/22 [id number]

## 2022-02-01 NOTE — DISCHARGE INSTRUCTIONS
Continue with your current medications.  Follow-up with primary care and neurology for reevaluation.  You need to have outpatient EEG set up.  Return to ER if again having feeling of dizziness, difficulty speech, blurred vision, numbness tingling or focal weakness etc.

## 2022-02-01 NOTE — CONSULTS
"Stroke Consult Note    Patient Name: Benjamin Richards   MRN: [id number]  Age: 52 y.o.  Sex: male  : 1970    Primary Care Physician: Lina Sena APRN  Referring Physician:  Kostas Zavala MD    TIME STROKE TEAM CALLED: 12:10 CST     TIME PATIENT SEEN: 12:15 CST    Handedness: Right  Race: White    Chief Complaint/Reason for Consultation: difficulty with concentration    HPI: Pt is a 52-yr-old right-handed white male with known diagnosis of hypertension, and 4-pk-yr smoker who presented after an episode of \"Feeling like I'm in a fog.\" Stated he was on the phone and could hear hisself talking but felt he was in a fog and could not think what he wanting to say. He also stated his vision was blurred and the room was spinning and lasted about an hour. Upon exam he is alert and oriented X4, strengths are equal 5/5, denies numbness, and visual field is intact.Pt stated he has been very forgetful right after conversations about what was said and this has been going on for a couple weeks.       Last Known Normal Date/Time: 10:00     Review of Systems   Constitutional: Positive for fever.   HENT: Negative.    Eyes: Negative.    Respiratory: Negative.    Cardiovascular: Negative.    Gastrointestinal: Negative.    Genitourinary: Negative.    Musculoskeletal: Negative.    Neurological: Negative.    Psychiatric/Behavioral: Negative.         Temp:  [98 °F (36.7 °C)] 98 °F (36.7 °C)  Heart Rate:  [77] 77  Resp:  [20] 20  BP: (145-163)/(80-88) 145/88    Neurological Exam  Mental Status  Awake, alert and oriented to person, place and time.Alert. Speech is normal. Language is fluent with no aphasia. Attention and concentration are normal.    Cranial Nerves  CN II: Visual acuity is normal. Visual fields full to confrontation.  CN III, IV, VI: Extraocular movements intact bilaterally. Normal lids and orbits bilaterally. Pupils equal round and reactive to light bilaterally.  CN V: Facial sensation is normal.  CN VII: " Full and symmetric facial movement.  CN IX, X: Palate elevates symmetrically. Normal gag reflex.  CN XI: Shoulder shrug strength is normal.  CN XII: Tongue midline without atrophy or fasciculations.    Motor  Normal muscle bulk throughout. No fasciculations present. Strength is 5/5 throughout all four extremities.    Sensory  Sensation is intact to light touch, pinprick, vibration and proprioception in all four extremities.    Reflexes  Not assessed.    Coordination  Finger-to-nose, rapid alternating movements and heel-to-shin normal bilaterally without dysmetria.    Gait  Not assessed.      Physical Exam  Vitals and nursing note reviewed.   Constitutional:       Appearance: Normal appearance.   HENT:      Head: Normocephalic and atraumatic.   Eyes:      General: Lids are normal.      Extraocular Movements: Extraocular movements intact.      Pupils: Pupils are equal, round, and reactive to light.   Cardiovascular:      Rate and Rhythm: Normal rate.   Pulmonary:      Effort: Pulmonary effort is normal.   Musculoskeletal:         General: Normal range of motion.      Cervical back: Normal range of motion.   Skin:     General: Skin is warm and dry.   Neurological:      General: No focal deficit present.      Mental Status: He is alert and oriented to person, place, and time.      Coordination: Coordination is intact.      Deep Tendon Reflexes: Strength normal.   Psychiatric:         Mood and Affect: Mood normal.         Speech: Speech normal.         Acute Stroke Data    Alteplase (tPA) Inclusion / Exclusion Criteria    Time: 12:35 CST  Person Administering Scale: SHALONDA Garibay    Inclusion Criteria  [x]   18 years of age or greater   []   Onset of symptoms < 4.5 hours before beginning treatment (stroke onset = time patient was last seen well or without symptoms).   []   Diagnosis of acute ischemic stroke causing measurable disabling deficit (Complete Hemianopia, Any Aphasia, Visual or Sensory Extinction, Any  weakness limiting sustained effort against gravity)   []   Any remaining deficit considered potentially disabling in view of patient and practitioner   Exclusion criteria (Do not proceed with Alteplase if any are checked under exclusion criteria)  []   Onset unknown or GREATER than 4.5 hours   []   ICH on CT/MRI   []   CT demonstrates hypodensity representing acute or subacute infarct   []   Significant head trauma or prior stroke in the previous 3 months   []   Symptoms suggestive of subarachnoid hemorrhage   []   History of un-ruptured intracranial aneurysm GREATER than 10 mm   []   Recent intracranial or intraspinal surgery within the last 3 months   []   Arterial puncture at a non-compressible site in the previous 7 days   []   Active internal bleeding   []   Acute bleeding tendency   []   Platelet count LESS than 100,000 for known hematological diseases such as leukemia, thrombocytopenia or chronic cirrhosis   []   Current use of anticoagulant with INR GREATER than 1.7 or PT GREATER than 15 seconds, aPTT GREATER than 40 seconds   []   Heparin received within 48 hours, resulting in abnormally elevated aPTT GREATER than upper limit of normal   []   Current use of direct thrombin inhibitors or direct factor Xa inhibitors in the past 48 hours   []   Elevated blood pressure refractory to treatment (systolic GREATER than 185 mm/Hg or diastolic  GREATER than 110 mm/Hg   []   Suspected infective endocarditis and aortic arch dissection   []   Current use of therapeutic treatment dose of low-molecular-weight heparin (LMWH) within the previous 24 hours   []   Structural GI malignancy or bleed   Relative exclusion for all patients  [x]   Only minor non-disabling symptoms   []   Pregnancy   []   Seizure at onset with postictal residual neurological impairments   []   Major surgery or previous trauma within past 14 days   []   History of previous spontaneous ICH, intracranial neoplasm, or AV malformation   []   Postpartum  (within previous 14 days)   []   Recent GI or urinary tract hemorrhage (within previous 21 days)   []   Recent acute MI (within previous 3 months)   []   History of un-ruptured intracranial aneurysm LESS than 10 mm   []   History of ruptured intracranial aneurysm   []   Blood glucose LESS than 50 mg/dL (2.7 mmol/L)   []   Dural puncture within the last 7 days   []   Known GREATER than 10 cerebral microbleeds   Additional exclusions for patients with symptoms onset between 3 and 4.5 hours.  []   Age > 80.   []   On any anticoagulants regardless of INR  >>> Warfarin (Coumadin), Heparin, Enoxaparin (Lovenox), fondaparinux (Arixtra), bivalirudin (Angiomax), Argatroban, dabigatran (Pradaxa), rivaroxaban (Xarelto), or apixaban (Eliquis)   []   Severe stroke (NIHSS > 25).   []   History of BOTH diabetes and previous ischemic stroke.   []   The risks and benefits have been discussed with the patient or family related to the administration of IV Alteplase for stroke symptoms.   []   I have discussed and reviewed the patient's case and imaging with the attending prior to IV Alteplase.   N/A Time Alteplase administered       Past Medical History:   Diagnosis Date   • Arthritis    • Injury of back    • Scoliosis      Past Surgical History:   Procedure Laterality Date   • BACK SURGERY     • GASTRIC BYPASS     • JENNY-EN-Y PROCEDURE       History reviewed. No pertinent family history.  Social History     Socioeconomic History   • Marital status:    Tobacco Use   • Smoking status: Current Some Day Smoker   Substance and Sexual Activity   • Alcohol use: No   • Drug use: No   • Sexual activity: Defer     No Known Allergies  Prior to Admission medications    Medication Sig Start Date End Date Taking? Authorizing Provider   amLODIPine (NORVASC) 10 MG tablet 1/2 to 1 tablet daily 8/24/21  Yes Natalie Fuller MD   DULoxetine (CYMBALTA) 60 MG capsule Take 60 mg by mouth. 9/2/21  Yes ProviderNatalie MD   lisinopril  (PRINIVIL,ZESTRIL) 40 MG tablet Take 40 mg by mouth Daily. 21 Yes Natalie Fuller MD   propranolol (INDERAL) 10 MG tablet Take 1 tablet by mouth 3 (Three) Times a Day As Needed. 21  Yes Natalie Fuller MD   HYDROcodone-acetaminophen (NORCO)  MG per tablet Take 1 tablet by mouth Every 6 (Six) Hours As Needed for Moderate Pain .    Natalie Fuller MD   LORazepam (ATIVAN) 1 MG tablet Take 1 mg by mouth Every 8 (Eight) Hours As Needed for Anxiety.    Natalie Fuller MD       [facility] Meds:  Scheduled-    Infusions-     PRNs- •  sodium chloride    Functional Status Prior to Current Stroke/Shai Score: 0    NIH Stroke Scale  Time: 12:35 CST  Person Administering Scale: SHALONDA Garibay    1a  Level of consciousness: 0=alert; keenly responsive   1b. LOC questions:  0=Performs both tasks correctly   1c. LOC commands: 0=Performs both tasks correctly   2.  Best Gaze: 0=normal   3.  Visual: 0=No visual loss   4. Facial Palsy: 0=Normal symmetric movement   5a.  Motor left arm: 0=No drift, limb holds 90 (or 45) degrees for full 10 seconds   5b.  Motor right arm: 0=No drift, limb holds 90 (or 45) degrees for full 10 seconds   6a. motor left le=No drift, limb holds 90 (or 45) degrees for full 10 seconds   6b  Motor right le=No drift, limb holds 90 (or 45) degrees for full 10 seconds   7. Limb Ataxia: 0=Absent   8.  Sensory: 0=Normal; no sensory loss   9. Best Language:  0=No aphasia, normal   10. Dysarthria: 0=Normal   11. Extinction and Inattention: 0=No abnormality    Total:   0       Results Reviewed:  I have personally reviewed current lab, radiology, and data and agree with results.  Lab Results (last 24 hours)     Procedure Component Value Units Date/Time    Comprehensive Metabolic Panel [[id number]] Collected: 22 1206    Specimen: Blood Updated: 22 1233     Glucose 96 mg/dL      BUN 14 mg/dL      Creatinine 1.07 mg/dL      Sodium 140 mmol/L       Potassium 4.0 mmol/L      Chloride 104 mmol/L      CO2 29.0 mmol/L      Calcium 9.4 mg/dL      Total Protein 6.8 g/dL      Albumin 4.80 g/dL      ALT (SGPT) 27 U/L      AST (SGOT) 24 U/L      Alkaline Phosphatase 76 U/L      Total Bilirubin 0.5 mg/dL      eGFR Non African Amer 72 mL/min/1.73      Globulin 2.0 gm/dL      A/G Ratio 2.4 g/dL      BUN/Creatinine Ratio 13.1     Anion Gap 7.0 mmol/L     Narrative:      GFR Normal >60  Chronic Kidney Disease <60  Kidney Failure <15      Troponin [[id number]]  (Normal) Collected: 02/01/22 1206    Specimen: Blood Updated: 02/01/22 1233     Troponin T <0.010 ng/mL     Narrative:      Troponin T Reference Range:  <= 0.03 ng/mL-   Negative for AMI  >0.03 ng/mL-     Abnormal for myocardial necrosis.  Clinicians would have to utilize clinical acumen, EKG, Troponin and serial changes to determine if it is an Acute Myocardial Infarction or myocardial injury due to an underlying chronic condition.       Results may be falsely decreased if patient taking Biotin.      aPTT [[id number]]  (Normal) Collected: 02/01/22 1206    Specimen: Blood Updated: 02/01/22 1230     PTT 28.7 seconds     Narrative:      The recommended Heparin therapeutic range is 68-97 seconds.    Protime-INR [[id number]]  (Normal) Collected: 02/01/22 1206    Specimen: Blood Updated: 02/01/22 1230     Protime 12.3 Seconds      INR 0.92    Narrative:      Therapeutic range for most indications is 2.0-3.0 INR,  or 2.5-3.5 for mechanical heart valves.    Extra Tubes [[id number]] Collected: 02/01/22 1219    Specimen: Blood, Venous Line Updated: 02/01/22 1220    Narrative:      The following orders were created for panel order Extra Tubes.  Procedure                               Abnormality         Status                     ---------                               -----------         ------                     Sommer Top[[id number]]                                         In process                   Please view results for these  tests on the individual orders.    Sommer Top [[id number]] Collected: 02/01/22 1219    Specimen: Blood Updated: 02/01/22 1220    CBC & Differential [[id number]]  (Abnormal) Collected: 02/01/22 1206    Specimen: Blood Updated: 02/01/22 1213    Narrative:      The following orders were created for panel order CBC & Differential.  Procedure                               Abnormality         Status                     ---------                               -----------         ------                     CBC Auto Differential[[id number]]        Abnormal            Final result                 Please view results for these tests on the individual orders.    CBC Auto Differential [[id number]]  (Abnormal) Collected: 02/01/22 1206    Specimen: Blood Updated: 02/01/22 1213     WBC 9.69 10*3/mm3      RBC 5.36 10*6/mm3      Hemoglobin 16.9 g/dL      Hematocrit 50.3 %      MCV 93.8 fL      MCH 31.5 pg      MCHC 33.6 g/dL      RDW 13.4 %      RDW-SD 46.4 fl      MPV 10.1 fL      Platelets 253 10*3/mm3      Neutrophil % 68.6 %      Lymphocyte % 23.2 %      Monocyte % 6.2 %      Eosinophil % 0.8 %      Basophil % 0.4 %      Immature Grans % 0.8 %      Neutrophils, Absolute 6.64 10*3/mm3      Lymphocytes, Absolute 2.25 10*3/mm3      Monocytes, Absolute 0.60 10*3/mm3      Eosinophils, Absolute 0.08 10*3/mm3      Basophils, Absolute 0.04 10*3/mm3      Immature Grans, Absolute 0.08 10*3/mm3      nRBC 0.0 /100 WBC     Gold Top - SST [[id number]] Collected: 02/01/22 1206    Specimen: Blood Updated: 02/01/22 1210    Green Top (Gel) [[id number]] Collected: 02/01/22 1206    Specimen: Blood Updated: 02/01/22 1210    Lavender Top [[id number]] Collected: 02/01/22 1206    Specimen: Blood Updated: 02/01/22 1210    Baker Draw [[id number]] Collected: 02/01/22 1206    Specimen: Blood Updated: 02/01/22 1210    Narrative:      The following orders were created for panel order Baker Draw.  Procedure                               Abnormality         Status                      ---------                               -----------         ------                     Green Top (Gel)[[id number]]                                  In process                 Lavender Top[[id number]]                                     In process                 Gold Top - SST[[id number]]                                   In process                 Light Blue Top[[id number]]                                   In process                   Please view results for these tests on the individual orders.    Light Blue Top [[id number]] Collected: 02/01/22 1206    Specimen: Blood Updated: 02/01/22 1210    POC Glucose Once [[id number]]  (Normal) Collected: 02/01/22 1140    Specimen: Blood Updated: 02/01/22 1155     Glucose 108 mg/dL      Comment: RN NotifiedNotify DoctorOperator: [id number] HAN HALEYMeter ID: [id number]           Imaging Results (Last 24 Hours)     Procedure Component Value Units Date/Time    XR Chest 1 View [[id number]] Resulted: 02/01/22 1233     Updated: 02/01/22 1233    CT Head Without Contrast Stroke Protocol [[id number]] Collected: 02/01/22 1144     Updated: 02/01/22 1216    Narrative:      Noncontrast CT examination of the brain.    INDICATION: Stroke protocol.       Technique: Axial 5 mm contiguous images with brain parenchymal  and bone windows    This exam was performed according to our departmental  dose-optimization program, which includes automated exposure  control, adjustment of the mA and/or kV according to patient size  and/or use of iterative reconstruction technique.    Prior relevant examination: None.    Brain parenchyma appears within normal limits. There are  bilateral basal ganglia calcifications (incidental significance).  Ventricles are within normal limits in size. No evidence of  abnormal mass or calcification is seen. No evidence of acute  hemorrhage is noted. Bony structures appear within normal limits  and the mastoid air cells and visualized paranasal sinuses  "are  normally aerated.        Impression:      1. Normal brain for age. There are no acute changes.    Electronically signed by:  Rene Shea MD  2/1/2022 12:14 PM CST  Workstation: [id number]            Assessment/Plan:  Pt is a 52-yr-old right-handed white male with known diagnosis of hypertension, and 4-pk-yr smoker who presented after an episode of \"Feeling like I'm in a fog.\" Stated he was on the phone and could hear hisself talking but felt he was in a fog and could not think what he wanting to say. He also stated his vision was blurred and the room was spinning and lasted about an hour. Upon exam he is alert and oriented X4, strengths are equal 5/5, denies numbness, and visual field is intact. Pt stated he has been very forgetful right after conversations about what was said and this has been going on for a couple weeks.  1. Difficulty concentrating and dizziness- Pt states he currently still feels \"in a fog\" other symptoms have resolved. Will get CTAs, MRI, EEG, and start aspirin 325 mg. Instructed on the importance of daily BP monitoring and control to reduce stroke risk. Instructed on stroke prevention and s/s. Pt and wife verbalized understanding.   If MRI and CTA is neg then he can follow-up with out patient EEG and neurology.  Case was discussed with pt, pt's wife, ER physician, and nursing. Thank you for the consult.           No diagnosis found.        SHALONDA Gairbay  February 1, 2022  12:35 CST              "

## 2022-02-10 LAB
QT INTERVAL: 378 MS
QTC INTERVAL: 422 MS